# Patient Record
Sex: FEMALE | Race: ASIAN | NOT HISPANIC OR LATINO
[De-identification: names, ages, dates, MRNs, and addresses within clinical notes are randomized per-mention and may not be internally consistent; named-entity substitution may affect disease eponyms.]

---

## 2021-07-14 PROBLEM — Z00.00 ENCOUNTER FOR PREVENTIVE HEALTH EXAMINATION: Status: ACTIVE | Noted: 2021-07-14

## 2021-08-25 ENCOUNTER — LABORATORY RESULT (OUTPATIENT)
Age: 21
End: 2021-08-25

## 2021-08-25 ENCOUNTER — APPOINTMENT (OUTPATIENT)
Dept: HEART AND VASCULAR | Facility: CLINIC | Age: 21
End: 2021-08-25
Payer: COMMERCIAL

## 2021-08-25 VITALS
SYSTOLIC BLOOD PRESSURE: 96 MMHG | HEIGHT: 61 IN | WEIGHT: 100 LBS | DIASTOLIC BLOOD PRESSURE: 74 MMHG | BODY MASS INDEX: 18.88 KG/M2 | HEART RATE: 52 BPM | TEMPERATURE: 97.8 F | OXYGEN SATURATION: 97 %

## 2021-08-25 DIAGNOSIS — E55.9 VITAMIN D DEFICIENCY, UNSPECIFIED: ICD-10-CM

## 2021-08-25 DIAGNOSIS — I49.1 ATRIAL PREMATURE DEPOLARIZATION: ICD-10-CM

## 2021-08-25 DIAGNOSIS — R07.89 OTHER CHEST PAIN: ICD-10-CM

## 2021-08-25 DIAGNOSIS — R10.9 UNSPECIFIED ABDOMINAL PAIN: ICD-10-CM

## 2021-08-25 DIAGNOSIS — I49.8 OTHER SPECIFIED CARDIAC ARRHYTHMIAS: ICD-10-CM

## 2021-08-25 DIAGNOSIS — R94.31 ABNORMAL ELECTROCARDIOGRAM [ECG] [EKG]: ICD-10-CM

## 2021-08-25 PROCEDURE — 36415 COLL VENOUS BLD VENIPUNCTURE: CPT

## 2021-08-25 PROCEDURE — 99203 OFFICE O/P NEW LOW 30 MIN: CPT

## 2021-08-25 PROCEDURE — 93000 ELECTROCARDIOGRAM COMPLETE: CPT

## 2021-08-26 PROBLEM — I49.8 BRADYARRHYTHMIA: Status: ACTIVE | Noted: 2021-08-26

## 2021-08-26 PROBLEM — R94.31 ABNORMAL ECG: Status: ACTIVE | Noted: 2021-08-26

## 2021-08-26 LAB
25(OH)D3 SERPL-MCNC: 23.7 NG/ML
ALBUMIN SERPL ELPH-MCNC: 4.7 G/DL
ALP BLD-CCNC: 47 U/L
ALT SERPL-CCNC: 12 U/L
ANION GAP SERPL CALC-SCNC: 11 MMOL/L
APPEARANCE: CLEAR
AST SERPL-CCNC: 19 U/L
BASOPHILS # BLD AUTO: 0.03 K/UL
BASOPHILS NFR BLD AUTO: 0.5 %
BILIRUB SERPL-MCNC: 0.4 MG/DL
BILIRUBIN URINE: NEGATIVE
BLOOD URINE: NEGATIVE
BUN SERPL-MCNC: 14 MG/DL
CALCIUM SERPL-MCNC: 10 MG/DL
CHLORIDE SERPL-SCNC: 104 MMOL/L
CHOLEST SERPL-MCNC: 168 MG/DL
CO2 SERPL-SCNC: 25 MMOL/L
COLOR: YELLOW
COVID-19 NUCLEOCAPSID  GAM ANTIBODY INTERPRETATION: NEGATIVE
COVID-19 SPIKE DOMAIN ANTIBODY INTERPRETATION: POSITIVE
CREAT SERPL-MCNC: 0.69 MG/DL
CREAT SPEC-SCNC: 243 MG/DL
CRP SERPL-MCNC: <3 MG/L
EOSINOPHIL # BLD AUTO: 0.1 K/UL
EOSINOPHIL NFR BLD AUTO: 1.8 %
ERYTHROCYTE [SEDIMENTATION RATE] IN BLOOD BY WESTERGREN METHOD: 12 MM/HR
GLUCOSE QUALITATIVE U: NEGATIVE
GLUCOSE SERPL-MCNC: 86 MG/DL
HCG SERPL-MCNC: <1 MIU/ML
HCT VFR BLD CALC: 46.8 %
HDLC SERPL-MCNC: 69 MG/DL
HGB BLD-MCNC: 14.5 G/DL
IMM GRANULOCYTES NFR BLD AUTO: 0.4 %
KETONES URINE: NEGATIVE
LDLC SERPL CALC-MCNC: 85 MG/DL
LEUKOCYTE ESTERASE URINE: NEGATIVE
LYMPHOCYTES # BLD AUTO: 2.42 K/UL
LYMPHOCYTES NFR BLD AUTO: 44.2 %
MAN DIFF?: NORMAL
MCHC RBC-ENTMCNC: 29.5 PG
MCHC RBC-ENTMCNC: 31 GM/DL
MCV RBC AUTO: 95.3 FL
MICROALBUMIN 24H UR DL<=1MG/L-MCNC: 6.8 MG/DL
MICROALBUMIN/CREAT 24H UR-RTO: 28 MG/G
MONOCYTES # BLD AUTO: 0.36 K/UL
MONOCYTES NFR BLD AUTO: 6.6 %
NEUTROPHILS # BLD AUTO: 2.54 K/UL
NEUTROPHILS NFR BLD AUTO: 46.5 %
NITRITE URINE: NEGATIVE
NONHDLC SERPL-MCNC: 99 MG/DL
PH URINE: 6
PLATELET # BLD AUTO: 268 K/UL
POTASSIUM SERPL-SCNC: 4.2 MMOL/L
PROT SERPL-MCNC: 7.4 G/DL
PROTEIN URINE: NORMAL
RBC # BLD: 4.91 M/UL
RBC # FLD: 12 %
SARS-COV-2 AB SERPL IA-ACNC: >250 U/ML
SARS-COV-2 AB SERPL QL IA: 0.08 INDEX
SODIUM SERPL-SCNC: 140 MMOL/L
SPECIFIC GRAVITY URINE: 1.03
TRIGL SERPL-MCNC: 69 MG/DL
TSH SERPL-ACNC: 0.77 UIU/ML
UROBILINOGEN URINE: NORMAL
WBC # FLD AUTO: 5.47 K/UL

## 2021-08-26 NOTE — ASSESSMENT
[FreeTextEntry1] : suspicious abdominal/pelvic discomfort  in reportedly non sexually active young adult female\par \par vaginal discharge , white \par \par period late \par \par sinus bradycardia with APC\par \par hx of hypovitaminosis D

## 2021-08-26 NOTE — REASON FOR VISIT
[Symptom and Test Evaluation] : symptom and test evaluation [Parent] : parent [FreeTextEntry1] : 21 year old female presents for evaluation of recent  chest pains

## 2021-08-26 NOTE — REVIEW OF SYSTEMS
[Abdominal Pain] : abdominal pain [Nausea] : no nausea [Vomiting] : no vomiting [Heartburn] : no heartburn [Change in Appetite] : no change in appetite [Change In The Stool] : no change in stool [Dysphagia] : no dysphagia [Diarrhea] : diarrhea [Constipation] : no constipation [Blood in Stool] : no blood in stool [Dysuria] : no dysuria [Pelvic Pain] : pelvic pain [Abn Vaginal Bleeding] : no unexplained vaginal bleeding [Negative] : Heme/Lymph [FreeTextEntry8] : white vaginal discharge

## 2021-08-26 NOTE — PHYSICAL EXAM
[Well Developed] : well developed [Well Nourished] : well nourished [No Acute Distress] : no acute distress [Normal Conjunctiva] : normal conjunctiva [No Xanthelasma] : no xanthelasma [Normal Venous Pressure] : normal venous pressure [No Carotid Bruit] : no carotid bruit [Normal S1, S2] : normal S1, S2 [No Murmur] : no murmur [No Rub] : no rub [No Gallop] : no gallop [Clear Lung Fields] : clear lung fields [Good Air Entry] : good air entry [No Respiratory Distress] : no respiratory distress  [Soft] : abdomen soft [No Masses/organomegaly] : no masses/organomegaly [Normal Bowel Sounds] : normal bowel sounds [Tenderness] : tenderness [Normal Gait] : normal gait [Gait - Sufficient for Exercise Testing] : gait - sufficient for exercise testing [No Edema] : no edema [No Cyanosis] : no cyanosis [No Clubbing] : no clubbing [No Varicosities] : no varicosities [No Rash] : no rash [No Skin Lesions] : no skin lesions [Moves all extremities] : moves all extremities [No Focal Deficits] : no focal deficits [Normal Speech] : normal speech [Alert and Oriented] : alert and oriented [Normal memory] : normal memory [de-identified] : anicteric  [de-identified] : pelvic and lower abdominal tenderness  with mild guarding , mostly left sided

## 2021-08-26 NOTE — HISTORY OF PRESENT ILLNESS
[FreeTextEntry1] : She reports a couple days of difficulty breathing last month  followed by abdominal discomfort  with nausea and vomiting  but has regular bowel movements . No rectal bleeding, fevers, chills . Regular menses , her period is late this month.   No personal hx of thrombophilia .  She used to be on OBC for regulation of her periods ? \par \par Never went to gynecologist.  Reports that she is not sexually active but she does note a white vaginal discharge \par \par Had [employer] covid vaccine in March \par \par Denies personal hx of covid  infection .  Says her pediatrician did give her the Guardasil vaccine for  HPV prevention. \par \par  Non smoker.    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

## 2021-08-26 NOTE — DISCUSSION/SUMMARY
[With Me] : with me [FreeTextEntry1] : venipuncture performed with  HCG, urinalysis , CRP, ESR, EZEQUIEL\par \par Patient advised to report worsening abdominal/pelvic discomfort ASAP  or development of fevers \par \par Advised that   it is appropriate to have gynecology exam  at her age  even if she is not sexually active at this time.

## 2021-09-01 LAB
ANA PAT FLD IF-IMP: ABNORMAL
ANACR T: ABNORMAL